# Patient Record
Sex: MALE | Race: WHITE | NOT HISPANIC OR LATINO | URBAN - METROPOLITAN AREA
[De-identification: names, ages, dates, MRNs, and addresses within clinical notes are randomized per-mention and may not be internally consistent; named-entity substitution may affect disease eponyms.]

---

## 2017-06-12 PROBLEM — Z00.00 ENCOUNTER FOR PREVENTIVE HEALTH EXAMINATION: Status: ACTIVE | Noted: 2017-06-12

## 2017-06-14 RX ORDER — SODIUM CHLORIDE 9 MG/ML
1000 INJECTION, SOLUTION INTRAVENOUS
Qty: 0 | Refills: 0 | Status: DISCONTINUED | OUTPATIENT
Start: 2017-06-15 | End: 2017-06-30

## 2017-06-14 NOTE — ASU PATIENT PROFILE, ADULT - PSH
Hernia, inguinal  30 years ago  S/P epidural steroid injection  x3  last one 5 years ago  S/P tonsillectomy  as child

## 2017-06-14 NOTE — ASU PATIENT PROFILE, ADULT - PMH
Gastroesophageal reflux disease, esophagitis presence not specified Anxiety    Gastroesophageal reflux disease, esophagitis presence not specified    HTN (hypertension)

## 2017-06-15 ENCOUNTER — OUTPATIENT (OUTPATIENT)
Dept: OUTPATIENT SERVICES | Facility: HOSPITAL | Age: 62
LOS: 1 days | Discharge: HOME | End: 2017-06-15

## 2017-06-15 VITALS
HEART RATE: 61 BPM | TEMPERATURE: 98 F | OXYGEN SATURATION: 100 % | SYSTOLIC BLOOD PRESSURE: 139 MMHG | DIASTOLIC BLOOD PRESSURE: 79 MMHG | RESPIRATION RATE: 16 BRPM

## 2017-06-15 VITALS
RESPIRATION RATE: 16 BRPM | TEMPERATURE: 99 F | DIASTOLIC BLOOD PRESSURE: 95 MMHG | OXYGEN SATURATION: 100 % | SYSTOLIC BLOOD PRESSURE: 152 MMHG | WEIGHT: 164.91 LBS | HEIGHT: 65 IN | HEART RATE: 50 BPM

## 2017-06-15 DIAGNOSIS — Z92.241 PERSONAL HISTORY OF SYSTEMIC STEROID THERAPY: Chronic | ICD-10-CM

## 2017-06-15 DIAGNOSIS — Z90.89 ACQUIRED ABSENCE OF OTHER ORGANS: Chronic | ICD-10-CM

## 2017-06-15 DIAGNOSIS — K40.90 UNILATERAL INGUINAL HERNIA, WITHOUT OBSTRUCTION OR GANGRENE, NOT SPECIFIED AS RECURRENT: Chronic | ICD-10-CM

## 2017-06-15 RX ORDER — PANTOPRAZOLE SODIUM 20 MG/1
1 TABLET, DELAYED RELEASE ORAL
Qty: 0 | Refills: 0 | COMMUNITY

## 2017-06-15 NOTE — ASU DISCHARGE PLAN (ADULT/PEDIATRIC). - MEDICATION SUMMARY - MEDICATIONS TO TAKE
I will START or STAY ON the medications listed below when I get home from the hospital:    gabapentin 300 mg oral capsule  -- 1 cap(s) by mouth 3 times a day  -- Indication: For ATYPICAL FACE PAIN    nortriptyline 25 mg oral capsule  -- 1 tab(s) by mouth once a day  -- Indication: For ATYPICAL FACE PAIN    Lexapro 10 mg oral tablet  -- 1 tab(s) by mouth once a day  -- Indication: For Anxiety    Xanax 0.5 mg oral tablet  -- 1 tab(s) by mouth 3 times a day, As Needed  -- Indication: For Anxiety    atenolol 25 mg oral tablet  -- 1 tab(s) by mouth once a day  -- Indication: For HTN (hypertension)

## 2017-06-21 DIAGNOSIS — K21.9 GASTRO-ESOPHAGEAL REFLUX DISEASE WITHOUT ESOPHAGITIS: ICD-10-CM

## 2017-06-21 DIAGNOSIS — G50.1 ATYPICAL FACIAL PAIN: ICD-10-CM

## 2017-06-21 DIAGNOSIS — F41.9 ANXIETY DISORDER, UNSPECIFIED: ICD-10-CM

## 2017-06-21 DIAGNOSIS — I10 ESSENTIAL (PRIMARY) HYPERTENSION: ICD-10-CM

## 2017-06-30 PROBLEM — K21.9 GASTRO-ESOPHAGEAL REFLUX DISEASE WITHOUT ESOPHAGITIS: Chronic | Status: ACTIVE | Noted: 2017-06-14

## 2017-07-13 ENCOUNTER — OUTPATIENT (OUTPATIENT)
Dept: OUTPATIENT SERVICES | Facility: HOSPITAL | Age: 62
LOS: 1 days | Discharge: HOME | End: 2017-07-13

## 2017-07-13 VITALS
RESPIRATION RATE: 16 BRPM | TEMPERATURE: 98 F | WEIGHT: 169.98 LBS | SYSTOLIC BLOOD PRESSURE: 138 MMHG | HEIGHT: 65 IN | OXYGEN SATURATION: 99 % | DIASTOLIC BLOOD PRESSURE: 89 MMHG | HEART RATE: 58 BPM

## 2017-07-13 VITALS
RESPIRATION RATE: 16 BRPM | TEMPERATURE: 98 F | DIASTOLIC BLOOD PRESSURE: 76 MMHG | HEART RATE: 60 BPM | OXYGEN SATURATION: 98 % | SYSTOLIC BLOOD PRESSURE: 146 MMHG

## 2017-07-13 DIAGNOSIS — K40.90 UNILATERAL INGUINAL HERNIA, WITHOUT OBSTRUCTION OR GANGRENE, NOT SPECIFIED AS RECURRENT: Chronic | ICD-10-CM

## 2017-07-13 DIAGNOSIS — Z92.241 PERSONAL HISTORY OF SYSTEMIC STEROID THERAPY: Chronic | ICD-10-CM

## 2017-07-13 DIAGNOSIS — Z90.89 ACQUIRED ABSENCE OF OTHER ORGANS: Chronic | ICD-10-CM

## 2017-07-13 NOTE — ASU PATIENT PROFILE, ADULT - PMH
Anxiety    Gastroesophageal reflux disease, esophagitis presence not specified    HTN (hypertension)

## 2017-07-13 NOTE — ASU DISCHARGE PLAN (ADULT/PEDIATRIC). - MEDICATION SUMMARY - MEDICATIONS TO TAKE
I will START or STAY ON the medications listed below when I get home from the hospital:    gabapentin 300 mg oral capsule  -- 1 cap(s) by mouth 3 times a day  -- Indication: For ATYPICAL FACE PAIN    nortriptyline 25 mg oral capsule  -- 1 tab(s) by mouth once a day  -- Indication: For ATYPICAL FACE PAIN    Lexapro 10 mg oral tablet  -- 1 tab(s) by mouth once a day  -- Indication: For per pmd    Xanax 0.5 mg oral tablet  -- 1 tab(s) by mouth 3 times a day, As Needed  -- Indication: For per pmd    atenolol 25 mg oral tablet  -- 1 tab(s) by mouth once a day  -- Indication: For per pmd

## 2017-07-19 DIAGNOSIS — G50.1 ATYPICAL FACIAL PAIN: ICD-10-CM

## 2017-07-19 DIAGNOSIS — I10 ESSENTIAL (PRIMARY) HYPERTENSION: ICD-10-CM

## 2017-07-19 DIAGNOSIS — F32.9 MAJOR DEPRESSIVE DISORDER, SINGLE EPISODE, UNSPECIFIED: ICD-10-CM

## 2017-07-19 DIAGNOSIS — K21.9 GASTRO-ESOPHAGEAL REFLUX DISEASE WITHOUT ESOPHAGITIS: ICD-10-CM

## 2017-07-19 DIAGNOSIS — Z87.891 PERSONAL HISTORY OF NICOTINE DEPENDENCE: ICD-10-CM

## 2017-07-19 DIAGNOSIS — F41.9 ANXIETY DISORDER, UNSPECIFIED: ICD-10-CM

## 2017-08-17 ENCOUNTER — OUTPATIENT (OUTPATIENT)
Dept: OUTPATIENT SERVICES | Facility: HOSPITAL | Age: 62
LOS: 1 days | Discharge: HOME | End: 2017-08-17
Payer: MEDICARE

## 2017-08-17 VITALS
OXYGEN SATURATION: 98 % | TEMPERATURE: 98 F | RESPIRATION RATE: 16 BRPM | WEIGHT: 175.49 LBS | HEART RATE: 61 BPM | HEIGHT: 66 IN | SYSTOLIC BLOOD PRESSURE: 144 MMHG | DIASTOLIC BLOOD PRESSURE: 87 MMHG

## 2017-08-17 VITALS
OXYGEN SATURATION: 99 % | DIASTOLIC BLOOD PRESSURE: 80 MMHG | TEMPERATURE: 98 F | SYSTOLIC BLOOD PRESSURE: 137 MMHG | HEART RATE: 66 BPM | RESPIRATION RATE: 16 BRPM

## 2017-08-17 DIAGNOSIS — K40.90 UNILATERAL INGUINAL HERNIA, WITHOUT OBSTRUCTION OR GANGRENE, NOT SPECIFIED AS RECURRENT: Chronic | ICD-10-CM

## 2017-08-17 DIAGNOSIS — Z90.89 ACQUIRED ABSENCE OF OTHER ORGANS: Chronic | ICD-10-CM

## 2017-08-17 DIAGNOSIS — Z92.241 PERSONAL HISTORY OF SYSTEMIC STEROID THERAPY: Chronic | ICD-10-CM

## 2017-08-17 PROCEDURE — 93010 ELECTROCARDIOGRAM REPORT: CPT

## 2017-08-17 NOTE — ASU DISCHARGE PLAN (ADULT/PEDIATRIC). - MEDICATION SUMMARY - MEDICATIONS TO TAKE
I will START or STAY ON the medications listed below when I get home from the hospital:    gabapentin 300 mg oral capsule  -- 1 cap(s) by mouth 3 times a day  -- Indication: For ATYPICAL FACIAL PAIN    nortriptyline 25 mg oral capsule  -- 1 tab(s) by mouth once a day  -- Indication: For ATYPICAL FACIAL PAIN    Lexapro 10 mg oral tablet  -- 1 tab(s) by mouth once a day  -- Indication: For per pmd    Xanax 0.5 mg oral tablet  -- 1 tab(s) by mouth 3 times a day, As Needed  -- Indication: For per pmd    atenolol 25 mg oral tablet  -- 1 tab(s) by mouth once a day  -- Indication: For per pmd

## 2017-08-24 DIAGNOSIS — F41.9 ANXIETY DISORDER, UNSPECIFIED: ICD-10-CM

## 2017-08-24 DIAGNOSIS — Z87.891 PERSONAL HISTORY OF NICOTINE DEPENDENCE: ICD-10-CM

## 2017-08-24 DIAGNOSIS — G50.1 ATYPICAL FACIAL PAIN: ICD-10-CM

## 2017-08-24 DIAGNOSIS — K21.9 GASTRO-ESOPHAGEAL REFLUX DISEASE WITHOUT ESOPHAGITIS: ICD-10-CM

## 2017-08-24 DIAGNOSIS — I10 ESSENTIAL (PRIMARY) HYPERTENSION: ICD-10-CM

## 2017-09-27 RX ORDER — FAMOTIDINE 10 MG/ML
20 INJECTION INTRAVENOUS ONCE
Qty: 0 | Refills: 0 | Status: COMPLETED | OUTPATIENT
Start: 2017-09-28 | End: 2017-09-28

## 2017-09-27 RX ORDER — SODIUM CHLORIDE 9 MG/ML
3 INJECTION INTRAMUSCULAR; INTRAVENOUS; SUBCUTANEOUS EVERY 8 HOURS
Qty: 0 | Refills: 0 | Status: DISCONTINUED | OUTPATIENT
Start: 2017-09-28 | End: 2017-10-13

## 2017-09-28 ENCOUNTER — OUTPATIENT (OUTPATIENT)
Dept: OUTPATIENT SERVICES | Facility: HOSPITAL | Age: 62
LOS: 1 days | Discharge: HOME | End: 2017-09-28

## 2017-09-28 VITALS
SYSTOLIC BLOOD PRESSURE: 140 MMHG | OXYGEN SATURATION: 100 % | RESPIRATION RATE: 16 BRPM | DIASTOLIC BLOOD PRESSURE: 75 MMHG | TEMPERATURE: 98 F | HEART RATE: 60 BPM

## 2017-09-28 VITALS
SYSTOLIC BLOOD PRESSURE: 140 MMHG | WEIGHT: 170.42 LBS | RESPIRATION RATE: 17 BRPM | HEIGHT: 66 IN | HEART RATE: 60 BPM | OXYGEN SATURATION: 100 % | DIASTOLIC BLOOD PRESSURE: 84 MMHG | TEMPERATURE: 98 F

## 2017-09-28 DIAGNOSIS — K40.90 UNILATERAL INGUINAL HERNIA, WITHOUT OBSTRUCTION OR GANGRENE, NOT SPECIFIED AS RECURRENT: Chronic | ICD-10-CM

## 2017-09-28 DIAGNOSIS — Z92.241 PERSONAL HISTORY OF SYSTEMIC STEROID THERAPY: Chronic | ICD-10-CM

## 2017-09-28 DIAGNOSIS — Z90.89 ACQUIRED ABSENCE OF OTHER ORGANS: Chronic | ICD-10-CM

## 2017-09-28 LAB
ANION GAP SERPL CALC-SCNC: 3 MMOL/L — LOW (ref 5–17)
BUN SERPL-MCNC: 13 MG/DL — SIGNIFICANT CHANGE UP (ref 7–23)
CALCIUM SERPL-MCNC: 9 MG/DL — SIGNIFICANT CHANGE UP (ref 8.5–10.1)
CHLORIDE SERPL-SCNC: 104 MMOL/L — SIGNIFICANT CHANGE UP (ref 96–108)
CO2 SERPL-SCNC: 30 MMOL/L — SIGNIFICANT CHANGE UP (ref 22–31)
CREAT SERPL-MCNC: 0.88 MG/DL — SIGNIFICANT CHANGE UP (ref 0.5–1.3)
GLUCOSE SERPL-MCNC: 93 MG/DL — SIGNIFICANT CHANGE UP (ref 70–99)
HCT VFR BLD CALC: 42.8 % — SIGNIFICANT CHANGE UP (ref 39–50)
HGB BLD-MCNC: 14.3 G/DL — SIGNIFICANT CHANGE UP (ref 13–17)
MCHC RBC-ENTMCNC: 30.4 PG — SIGNIFICANT CHANGE UP (ref 27–34)
MCHC RBC-ENTMCNC: 33.4 GM/DL — SIGNIFICANT CHANGE UP (ref 32–36)
MCV RBC AUTO: 91.1 FL — SIGNIFICANT CHANGE UP (ref 80–100)
PLATELET # BLD AUTO: 252 K/UL — SIGNIFICANT CHANGE UP (ref 150–400)
POTASSIUM SERPL-MCNC: 4.1 MMOL/L — SIGNIFICANT CHANGE UP (ref 3.5–5.3)
POTASSIUM SERPL-SCNC: 4.1 MMOL/L — SIGNIFICANT CHANGE UP (ref 3.5–5.3)
RBC # BLD: 4.7 M/UL — SIGNIFICANT CHANGE UP (ref 4.2–5.8)
RBC # FLD: 12.4 % — SIGNIFICANT CHANGE UP (ref 10.3–14.5)
SODIUM SERPL-SCNC: 137 MMOL/L — SIGNIFICANT CHANGE UP (ref 135–145)
WBC # BLD: 6.3 K/UL — SIGNIFICANT CHANGE UP (ref 3.8–10.5)
WBC # FLD AUTO: 6.3 K/UL — SIGNIFICANT CHANGE UP (ref 3.8–10.5)

## 2017-09-28 RX ORDER — NORTRIPTYLINE HYDROCHLORIDE 10 MG/1
1 CAPSULE ORAL
Qty: 0 | Refills: 0 | COMMUNITY

## 2017-09-28 RX ORDER — CELECOXIB 200 MG/1
200 CAPSULE ORAL ONCE
Qty: 0 | Refills: 0 | Status: COMPLETED | OUTPATIENT
Start: 2017-09-28 | End: 2017-09-28

## 2017-09-28 RX ORDER — ATENOLOL 25 MG/1
1 TABLET ORAL
Qty: 0 | Refills: 0 | COMMUNITY

## 2017-09-28 RX ORDER — OXYCODONE HYDROCHLORIDE 5 MG/1
10 TABLET ORAL ONCE
Qty: 0 | Refills: 0 | Status: COMPLETED | OUTPATIENT
Start: 2017-09-28 | End: 2017-09-28

## 2017-09-28 RX ORDER — GABAPENTIN 400 MG/1
1 CAPSULE ORAL
Qty: 0 | Refills: 0 | COMMUNITY

## 2017-09-28 RX ORDER — ALPRAZOLAM 0.25 MG
1 TABLET ORAL
Qty: 0 | Refills: 0 | COMMUNITY

## 2017-09-28 RX ORDER — ESCITALOPRAM OXALATE 10 MG/1
1 TABLET, FILM COATED ORAL
Qty: 0 | Refills: 0 | COMMUNITY

## 2017-09-28 RX ADMIN — FAMOTIDINE 20 MILLIGRAM(S): 10 INJECTION INTRAVENOUS at 11:59

## 2017-09-28 RX ADMIN — CELECOXIB 200 MILLIGRAM(S): 200 CAPSULE ORAL at 11:59

## 2017-09-28 NOTE — BRIEF OPERATIVE NOTE - PROCEDURE
<<-----Click on this checkbox to enter Procedure Left stellate ganglion block  09/28/2017  flouroscopic guided  Active  [id number]

## 2017-09-28 NOTE — ASU DISCHARGE PLAN (ADULT/PEDIATRIC). - MEDICATION SUMMARY - MEDICATIONS TO TAKE
I will START or STAY ON the medications listed below when I get home from the hospital:    gabapentin 300 mg oral capsule  -- 1 cap(s) by mouth 3 times a day  -- Indication: For atypical facial pain    nortriptyline 25 mg oral capsule  -- 1 tab(s) by mouth once a day  -- Indication: For atypical facial pain    Lexapro 10 mg oral tablet  -- 1 tab(s) by mouth once a day  -- Indication: For per pmd    Xanax 0.5 mg oral tablet  -- 1 tab(s) by mouth 3 times a day, As Needed  -- Indication: For per pme    atenolol 25 mg oral tablet  -- 1 tab(s) by mouth once a day  -- Indication: For per pmd

## 2017-10-04 DIAGNOSIS — G50.1 ATYPICAL FACIAL PAIN: ICD-10-CM

## 2017-10-04 DIAGNOSIS — K21.9 GASTRO-ESOPHAGEAL REFLUX DISEASE WITHOUT ESOPHAGITIS: ICD-10-CM

## 2017-10-04 DIAGNOSIS — F41.9 ANXIETY DISORDER, UNSPECIFIED: ICD-10-CM

## 2017-10-04 DIAGNOSIS — I10 ESSENTIAL (PRIMARY) HYPERTENSION: ICD-10-CM

## 2018-03-04 NOTE — BRIEF OPERATIVE NOTE - ASSISTANT(S)
none

## 2019-08-14 NOTE — ASU PATIENT PROFILE, ADULT - HARM RISK FACTORS
no
20 y/o otherwise health male ? atraumatic headache with nausea and vomiting. Will obtain labs including coags, head CT, give Reglan, and reassess.

## 2020-09-18 NOTE — BRIEF OPERATIVE NOTE - SPECIMENS
"S:  Lenora returns 2 weeks after laparoscopic appendectomy  by Dr. Martinez for acute appendicitis. Doing well post operatively, tolerating a general diet, having regular bowel movements, passing flatus, no diarrhea, doesn't take narcotics. Specifically denies fevers, chills, nausea, vomiting, shortness of breath.     O:  BP 94/60   Pulse 90   Temp 97.6  F (36.4  C)   Ht 1.575 m (5' 2\")   Wt 67.1 kg (148 lb)   SpO2 96%   BMI 27.07 kg/m    Gen: Alert and orientedx4, no apparent distress, ambulating without difficulty  Abd: Soft, ND/NT no rebound, no guarding  Wound: Clean, dry, intact, no erythema, necrosis or drainage worrisome for infection.     A/P  #1 S/P laparoscopic appendectomy     She looks wonderful. She has made a satisfactory recovery from surgery. I do not need to see her again unless she has any issues in regard to this surgery. I've provided office phone numbers and told her not to hesitate to call with any questions, comments or concerns. I'd like a call if she has any fevers over 101.3, nausea/vomiting, pain out of control.  She  been instructed to refrain from lifting greater than 10 lbs (or a gallon of milk) for another 2 weeks and to avoid strenuous activity as well.    TRAVIS Muhammad CNS CNS  Surgery & Wound Care  [place]  " none

## 2020-11-25 PROBLEM — F41.9 ANXIETY DISORDER, UNSPECIFIED: Chronic | Status: ACTIVE | Noted: 2017-06-15

## 2020-11-25 PROBLEM — I10 ESSENTIAL (PRIMARY) HYPERTENSION: Chronic | Status: ACTIVE | Noted: 2017-06-15

## 2020-11-30 ENCOUNTER — APPOINTMENT (OUTPATIENT)
Dept: SPINE | Facility: CLINIC | Age: 65
End: 2020-11-30
Payer: MEDICARE

## 2020-11-30 VITALS
OXYGEN SATURATION: 97 % | HEART RATE: 75 BPM | DIASTOLIC BLOOD PRESSURE: 91 MMHG | TEMPERATURE: 98.3 F | WEIGHT: 160 LBS | HEIGHT: 66 IN | BODY MASS INDEX: 25.71 KG/M2 | SYSTOLIC BLOOD PRESSURE: 149 MMHG

## 2020-11-30 PROCEDURE — 99203 OFFICE O/P NEW LOW 30 MIN: CPT

## 2020-11-30 RX ORDER — ESCITALOPRAM OXALATE 5 MG/1
TABLET, FILM COATED ORAL
Refills: 0 | Status: ACTIVE | COMMUNITY

## 2020-11-30 NOTE — ASSESSMENT
[FreeTextEntry1] : Right sided  lumbar radiculopathy superimposed on peripheral neuropathy and an MRI of the lower spine will  be ordered to evaluate his symptoms.  Once the images are complete, he will return for image review.  \par \par \par  \par \par CC:\par Red Sheehan MD\par [facility] \[place] [address]

## 2020-11-30 NOTE — REVIEW OF SYSTEMS
[Leg Weakness] : leg weakness [Numbness] : numbness [Tingling] : tingling [Difficulty Walking] : difficulty walking [Negative] : Heme/Lymph [de-identified] : back and right leg pain

## 2020-11-30 NOTE — HISTORY OF PRESENT ILLNESS
[> 3 months] : more  than 3 months [FreeTextEntry1] : Lumbar Radiculopathy [de-identified] : 65-year-old gentleman ith a long history of chronic pain in multiple areas who has been diagnosed in the past by EMG testing of small fiber peripheral neuropathy.  [place] onset of walking difficulty.  He went to the ED at [facility] and he was treated with Morphine and imaging and was sent home. An abdominal CT was done to rule out renal stone. It did not show a thing that would course patient's right-sided leg and groin pain. He reports back and right lower leg pain.  The pain is sharp and shooting rated at a 10/10.  .  He has tightness of his thigh on the right side .  The pain is described as electric shocks, into the leg .  Occasional pain in the left leg pain.  A CT scan  was performed in the ED .  No urine or stool incontinence.  EMG studies were complete in the past in 2019  . [Pain] : pain [___ wks] : [unfilled] week(s) ago [10] : currently ~his/her~ pain is 10 out of 10 [Standing] : worsened by standing [Walking] : worsened by walking [Recumbency] : relieved by recumbency [Rest] : relieved by rest

## 2020-11-30 NOTE — HISTORY OF PRESENT ILLNESS
[> 3 months] : more  than 3 months [FreeTextEntry1] : Lumbar Radiculopathy [de-identified] : 65-year-old gentleman ith a long history of chronic pain in multiple areas who has been diagnosed in the past by EMG testing of small fiber peripheral neuropathy.  [place] experienced an acute onset of walking difficulty.  He went to the ED at [facility] and he was treated with Morphine and imaging and was sent home. An abdominal CT was done to rule out renal stone. It did not show a thing that would course patient's right-sided leg and groin pain. He reports back and right lower leg pain.  The pain is sharp and shooting rated at a 10/10.  .  He has tightness of his thigh on the right side .  The pain is described as electric shocks, into the leg .  Occasional pain in the left leg pain.  A CT scan  was performed in the ED .  No urine or stool incontinence.  EMG studies were complete in the past in 2019  . [Pain] : pain [___ wks] : [unfilled] week(s) ago [10] : currently ~his/her~ pain is 10 out of 10 [Standing] : worsened by standing [Walking] : worsened by walking [Recumbency] : relieved by recumbency [Rest] : relieved by rest

## 2020-11-30 NOTE — PHYSICAL EXAM
[Person] : oriented to person [Place] : oriented to place [Time] : oriented to time [Short Term Intact] : short term memory intact [Remote Intact] : remote memory intact [Span Intact] : the attention span was normal [Concentration Intact] : normal concentrating ability [Fluency] : fluency intact [Comprehension] : comprehension intact [Current Events] : adequate knowledge of current events [Past History] : adequate knowledge of personal past history [Vocabulary] : adequate range of vocabulary [Cranial Nerves Optic (II)] : visual acuity intact bilaterally,  pupils equal round and reactive to light [Cranial Nerves Oculomotor (III)] : extraocular motion intact [Cranial Nerves Trigeminal (V)] : facial sensation intact symmetrically [Cranial Nerves Facial (VII)] : face symmetrical [Cranial Nerves Vestibulocochlear (VIII)] : hearing was intact bilaterally [Cranial Nerves Glossopharyngeal (IX)] : tongue and palate midline [Cranial Nerves Accessory (XI - Cranial And Spinal)] : head turning and shoulder shrug symmetric [Cranial Nerves Hypoglossal (XII)] : there was no tongue deviation with protrusion [Motor Tone] : muscle tone was normal in all four extremities [Motor Strength] : muscle strength was normal in all four extremities [No Muscle Atrophy] : normal bulk in all four extremities [Sensation Tactile Decrease] : light touch was intact [Abnormal Walk] : normal gait [Balance] : balance was intact [Past-pointing] : there was no past-pointing [Tremor] : no tremor present [1+] : Ankle jerk left 1+ [Plantar Reflex Right Only] : normal on the right [Plantar Reflex Left Only] : normal on the left [___] : absent on the right [___] : absent on the left [Persaud] : Persaud's sign was not demonstrated [L'Hermitte's] : neck flexion did not produce tingling down the spine/arms [Spurling's - Opposite Side] : Negative Spurling's on opposite side [Spurling's Same Side] : Negative Spurling's on same side [No Tenderness to Palpation] : no spine tenderness on palpation [Straight-Leg Raise Test - Left] : straight leg raise of the left leg was negative [Straight-Leg Raise Test - Right] : straight leg raise  of the right leg was negative [Able to toe walk] : the patient was able to toe walk [Able to heel walk] : the patient was able to heel walk

## 2020-11-30 NOTE — ASSESSMENT
[FreeTextEntry1] : Right sided  lumbar radiculopathy superimposed on peripheral neuropathy and an MRI of the lower spine will  be ordered to evaluate his symptoms.  Once the images are complete, he will return for image review.  \par \par \par  \par \par CC:\par Red Sheehan MD\par [facility] \[place] [address]

## 2020-11-30 NOTE — CONSULT LETTER
[Dear  ___] : Dear  [unfilled], [Consult Letter:] : I had the pleasure of evaluating your patient, [unfilled]. [Please see my note below.] : Please see my note below. [Consult Closing:] : Thank you very much for allowing me to participate in the care of this patient.  If you have any questions, please do not hesitate to contact me. [Sincerely,] : Sincerely, [FreeTextEntry3] : Fortino Stock MD\par Chief of Neurosurgery [facility]\par [facility]\par

## 2020-11-30 NOTE — REVIEW OF SYSTEMS
[Leg Weakness] : leg weakness [Numbness] : numbness [Tingling] : tingling [Difficulty Walking] : difficulty walking [Negative] : Heme/Lymph [de-identified] : back and right leg pain

## 2020-11-30 NOTE — CONSULT LETTER
[Dear  ___] : Dear  [unfilled], [Consult Letter:] : I had the pleasure of evaluating your patient, [unfilled]. [Please see my note below.] : Please see my note below. [Consult Closing:] : Thank you very much for allowing me to participate in the care of this patient.  If you have any questions, please do not hesitate to contact me. [Sincerely,] : Sincerely, [FreeTextEntry3] : Fortino Stock MD\par Chief of Neurosurgery [facility]\par [facility]\par

## 2020-11-30 NOTE — REASON FOR VISIT
[New Patient Visit] : a new patient visit [Other: _____] : [unfilled] [FreeTextEntry1] : Lumbar radiculopathy

## 2020-12-07 ENCOUNTER — APPOINTMENT (OUTPATIENT)
Dept: SPINE | Facility: CLINIC | Age: 65
End: 2020-12-07
Payer: MEDICARE

## 2020-12-07 VITALS
TEMPERATURE: 98 F | WEIGHT: 160 LBS | RESPIRATION RATE: 18 BRPM | HEART RATE: 80 BPM | DIASTOLIC BLOOD PRESSURE: 90 MMHG | OXYGEN SATURATION: 96 % | HEIGHT: 66 IN | SYSTOLIC BLOOD PRESSURE: 155 MMHG | BODY MASS INDEX: 25.71 KG/M2

## 2020-12-07 DIAGNOSIS — F17.200 NICOTINE DEPENDENCE, UNSPECIFIED, UNCOMPLICATED: ICD-10-CM

## 2020-12-07 DIAGNOSIS — M54.16 RADICULOPATHY, LUMBAR REGION: ICD-10-CM

## 2020-12-07 PROCEDURE — 99212 OFFICE O/P EST SF 10 MIN: CPT

## 2020-12-07 NOTE — PHYSICAL EXAM
[Motor Strength] : muscle strength was normal in all four extremities [FreeTextEntry8] : using a single crutch

## 2020-12-07 NOTE — ASSESSMENT
[FreeTextEntry1] : 65 year old male with lower back and right sided radicular pain.   A far lateral disc herniation is noted.  He will undergo PT and see a pain specialist - Dr Hutchinson.   In one month he  will return for follow up.

## 2020-12-07 NOTE — REASON FOR VISIT
[Follow-Up: _____] : a [unfilled] follow-up visit [Spouse] : spouse [FreeTextEntry1] : 65 year old male with lower back and leg pain, more on the right side.  He has numbness of his right leg.   An updated MRI was ordered and shows a far lateral disc herniation on the right at L2-3.  There are other areas of noncompressive degenerative changes. He has not had any physical therapy recently and has not had any pain management with epidural steroid injections. the strength and sensation are normal. We'll try a course of physical therapy and pain management prior to considering surgery.

## 2020-12-07 NOTE — REVIEW OF SYSTEMS
[Numbness] : numbness [Tingling] : tingling [Difficulty Walking] : difficulty walking [Negative] : Heme/Lymph [de-identified] : lower back pain and right leg pain

## 2021-01-07 ENCOUNTER — APPOINTMENT (OUTPATIENT)
Dept: SPINE | Facility: CLINIC | Age: 66
End: 2021-01-07

## 2021-07-06 ENCOUNTER — APPOINTMENT (OUTPATIENT)
Dept: OTOLARYNGOLOGY | Facility: CLINIC | Age: 66
End: 2021-07-06
Payer: MEDICARE

## 2021-07-06 VITALS
DIASTOLIC BLOOD PRESSURE: 95 MMHG | HEIGHT: 66 IN | SYSTOLIC BLOOD PRESSURE: 191 MMHG | HEART RATE: 60 BPM | WEIGHT: 165 LBS | BODY MASS INDEX: 26.52 KG/M2

## 2021-07-06 PROCEDURE — 31575 DIAGNOSTIC LARYNGOSCOPY: CPT

## 2021-07-06 PROCEDURE — 99204 OFFICE O/P NEW MOD 45 MIN: CPT | Mod: 25

## 2021-07-06 RX ORDER — ESCITALOPRAM OXALATE 20 MG/1
20 TABLET ORAL
Qty: 90 | Refills: 0 | Status: COMPLETED | COMMUNITY
Start: 2020-10-27

## 2021-07-06 RX ORDER — AMLODIPINE BESYLATE 5 MG/1
TABLET ORAL
Refills: 0 | Status: ACTIVE | COMMUNITY

## 2021-07-06 RX ORDER — PANTOPRAZOLE 40 MG/1
40 TABLET, DELAYED RELEASE ORAL
Qty: 14 | Refills: 0 | Status: ACTIVE | COMMUNITY
Start: 2021-06-02

## 2021-07-06 RX ORDER — MONTELUKAST 10 MG/1
10 TABLET, FILM COATED ORAL
Qty: 30 | Refills: 0 | Status: COMPLETED | COMMUNITY
Start: 2021-02-15

## 2021-07-06 NOTE — REASON FOR VISIT
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

## 2021-07-06 NOTE — HISTORY OF PRESENT ILLNESS
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  States having radiating burning pain from left eye, nose, throat and uvula down to [place].  This has been the case for 16 years.  Patient states dysphagia with solids and liquids, constant need to swallow, slurs words and voice changes, feels like he is drooling and is constantly needing to clean his mouth. Denies odynophagia, dyspnea, throat infections.  His weight is stable.  States has daily left ear tinnitus, and states feels his balance is constantly off and getting worse.  Patient denies otalgia, otorrhea, ear infections, hearing loss, tinnitus, dizziness, vertigo, headaches related to hearing.  has a history of sinusitis issues from childhood, but states no issues since then.  States nasal congestion with season changes.   was exposed to a large amount of caustic soda in the past due to working as a .  States taking pantoprazole as needed.  He has 2-4 beers daily.  He has smoking marijuana 2-3 times daily.  He smoked cigarettes for about 5 years.

## 2021-10-05 ENCOUNTER — APPOINTMENT (OUTPATIENT)
Dept: OTOLARYNGOLOGY | Facility: CLINIC | Age: 66
End: 2021-10-05

## 2022-01-13 ENCOUNTER — APPOINTMENT (OUTPATIENT)
Dept: RHEUMATOLOGY | Facility: CLINIC | Age: 67
End: 2022-01-13

## 2022-02-10 ENCOUNTER — NON-APPOINTMENT (OUTPATIENT)
Age: 67
End: 2022-02-10

## 2022-02-10 ENCOUNTER — APPOINTMENT (OUTPATIENT)
Dept: GASTROENTEROLOGY | Facility: CLINIC | Age: 67
End: 2022-02-10
Payer: MEDICARE

## 2022-02-10 VITALS
SYSTOLIC BLOOD PRESSURE: 140 MMHG | OXYGEN SATURATION: 95 % | BODY MASS INDEX: 25.07 KG/M2 | HEART RATE: 67 BPM | TEMPERATURE: 97.7 F | DIASTOLIC BLOOD PRESSURE: 90 MMHG | WEIGHT: 156 LBS | HEIGHT: 66 IN

## 2022-02-10 DIAGNOSIS — K21.9 GASTRO-ESOPHAGEAL REFLUX DISEASE W/OUT ESOPHAGITIS: ICD-10-CM

## 2022-02-10 PROCEDURE — 99205 OFFICE O/P NEW HI 60 MIN: CPT

## 2022-02-10 RX ORDER — NORTRIPTYLINE HYDROCHLORIDE 25 MG/1
25 CAPSULE ORAL
Qty: 90 | Refills: 0 | Status: DISCONTINUED | COMMUNITY
Start: 2021-04-26 | End: 2022-02-10

## 2022-02-14 ENCOUNTER — NON-APPOINTMENT (OUTPATIENT)
Age: 67
End: 2022-02-14

## 2022-02-25 ENCOUNTER — OUTPATIENT (OUTPATIENT)
Dept: OUTPATIENT SERVICES | Facility: HOSPITAL | Age: 67
LOS: 1 days | Discharge: HOME | End: 2022-02-25
Payer: MEDICARE

## 2022-02-25 ENCOUNTER — APPOINTMENT (OUTPATIENT)
Dept: GASTROENTEROLOGY | Facility: HOSPITAL | Age: 67
End: 2022-02-25
Payer: MEDICARE

## 2022-02-25 VITALS
HEIGHT: 65 IN | WEIGHT: 154.98 LBS | HEART RATE: 59 BPM | DIASTOLIC BLOOD PRESSURE: 81 MMHG | RESPIRATION RATE: 15 BRPM | OXYGEN SATURATION: 100 % | SYSTOLIC BLOOD PRESSURE: 151 MMHG | TEMPERATURE: 98 F

## 2022-02-25 DIAGNOSIS — Z90.89 ACQUIRED ABSENCE OF OTHER ORGANS: Chronic | ICD-10-CM

## 2022-02-25 DIAGNOSIS — K40.90 UNILATERAL INGUINAL HERNIA, WITHOUT OBSTRUCTION OR GANGRENE, NOT SPECIFIED AS RECURRENT: Chronic | ICD-10-CM

## 2022-02-25 DIAGNOSIS — Z92.241 PERSONAL HISTORY OF SYSTEMIC STEROID THERAPY: Chronic | ICD-10-CM

## 2022-02-25 DIAGNOSIS — K21.9 GASTRO-ESOPHAGEAL REFLUX DISEASE WITHOUT ESOPHAGITIS: ICD-10-CM

## 2022-02-25 PROCEDURE — 91010 ESOPHAGUS MOTILITY STUDY: CPT | Mod: 26

## 2022-02-25 PROCEDURE — 91037 ESOPH IMPED FUNCTION TEST: CPT | Mod: 26

## 2022-02-25 DEVICE — CATH VERSAFLEX Z PH: Type: IMPLANTABLE DEVICE | Status: FUNCTIONAL

## 2022-02-25 NOTE — ASU PATIENT PROFILE, ADULT - NSICDXPASTMEDICALHX_GEN_ALL_CORE_FT
PAST MEDICAL HISTORY:  Anxiety     Gastroesophageal reflux disease, esophagitis presence not specified     HTN (hypertension)

## 2022-02-25 NOTE — ASU PATIENT PROFILE, ADULT - ALCOHOL USE HISTORY SINGLE SELECT
Patient Education   Patient Education     Urinary Tract Infections in Women    Urinary tract infections (UTIs) are most often caused by bacteria. These bacteria enter the urinary tract. The bacteria may come from outside the body. Or they may travel from the skin outside the rectum or vagina into the urethra. Female anatomy makes it easy for bacteria from the bowel to enter a woman’s urinary tract, which is the most common source of UTI. This means women develop UTIs more often than men. Pain in or around the urinary tract is a common UTI symptom. But the only way to know for sure if you have a UTI for the healthcare provider to test your urine. The two tests that may be done are the urinalysis and urine culture.  Types of UTIs  · Cystitis. A bladder infection (cystitis) is the most common UTI in women. You may have urgent or frequent urination. You may also have pain, burning when you urinate, and bloody urine.  · Urethritis. This is an inflamed urethra, which is the tube that carries urine from the bladder to outside the body. You may have lower stomach or back pain. You may also have urgent or frequent urination.  · Pyelonephritis. This is a kidney infection. If not treated, it can be serious and damage your kidneys. In severe cases, you may need to stay in the hospital. You may have a fever and lower back pain.  Medicines to treat a UTI  Most UTIs are treated with antibiotics. These kill the bacteria. The length of time you need to take them depends on the type of infection. It may be as short as 3 days. If you have repeated UTIs, you may need a low-dose antibiotic for several months. Take antibiotics exactly as directed. Don’t stop taking them until all of the medicine is gone. If you stop taking the antibiotic too soon, the infection may not go away. You may also develop a resistance to the antibiotic. This can make it much harder to treat.  Lifestyle changes to treat and prevent UTIs  The lifestyle changes  below will help get rid of your UTI. They may also help prevent future UTIs.  · Drink plenty of fluids. This includes water, juice, or other caffeine-free drinks. Fluids help flush bacteria out of your body.  · Empty your bladder. Always empty your bladder when you feel the urge to urinate. And always urinate before going to sleep. Urine that stays in your bladder can lead to infection. Try to urinate before and after sex as well.  · Practice good personal hygiene. Wipe yourself from front to back after using the toilet. This helps keep bacteria from getting into the urethra.  · Use condoms during sex. These help prevent UTIs caused by sexually transmitted bacteria. Also don't use spermicides during sex. These can increase the risk for UTIs. Choose other forms of birth control instead. For women who tend to get UTIs after sex, a low-dose of a preventive antibiotic may be used. Be sure to discuss this option with your healthcare provider.  · Follow up with your healthcare provider as directed. He or she may test to make sure the infection has cleared. If needed, more treatment may be started.  Date Last Reviewed: 1/1/2017  © 4556-7778 [employer]. [address] [place] [address]. All rights reserved. This information is not intended as a substitute for professional medical care. Always follow your healthcare professional's instructions.           MEDICATION:  NITROFURANTOIN  Nitrofurantoin (brand: Macrodantin, Macrobid, Furadantin) is an antibiotic used for urinary tract infections.  DIRECTIONS FOR USE:  To prevent upset stomach and improve the absorption of this medicine, take this drug with food or milk.  Take this medicine at regular intervals. Take all of the medicine until it is gone, even if you are feeling better. This will ensure that the infection is fully treated.  Do not take within one hour of magnesium-containing antacids.  WHAT TO WATCH FOR:  POSSIBLE SIDE EFFECTS: Nausea,  vomiting, diarrhea, loss of appetite, abdominal pain (Take the medicine with food). Headache, dizziness, asthma attacks, vaginal yeast infection or thrush (Contact your doctor if any of these symptoms persist or become severe). Dark yellow to brown urine color (This is harmless). Chest pain, shortness of breath, unusual cough, easy bruising/bleeding, numbness or tingling in the arms or leg , severe muscle weakness, yellowing of the eyes/skin (Contact your doctor or return to this facility).  ALLERGIC REACTION: Rash, itching, swelling, trouble breathing or swallowing (Contact your doctor or return to this facility promptly).  MEDICAL CONDITIONS: Before starting this medicine, be sure your doctor knows if you have any of the following conditions:  · Last month of pregnancy, breastfeeding an infant less than 1 month old  · Less than 1 month of age  · Kidney or liver disease, diabetes  · Lung disease  · Certain genetic conditions (glucose-6-phosphate dehydrogenase deficiency  · Vitamin B deficiency  · Certain nerve conditions (peripheral neuropathy, optic neuritis)  DRUG INTERACTION: Before starting this medicine, be sure your doctor knows if you are taking any of the following:  · Probenecid  · Quinolone antibiotics (e.g., ciprofloxacin, norfloxacin)  · Live vaccines  · sulfinpyrazone  [NOTE: This information topic may not include all directions, precautions, medical conditions, drug/food interactions, and warnings for this drug. Check with your doctor, nurse, or pharmacist for any questions that you may have.]  © 4351-9545 [facility], [address] [place] [address], [place] [address]. All rights reserved. This information is not intended as a substitute for professional medical care. Always follow your healthcare professional's instructions.      yes...

## 2022-02-25 NOTE — ASU PATIENT PROFILE, ADULT - FALL HARM RISK - UNIVERSAL INTERVENTIONS
Bed in lowest position, wheels locked, appropriate side rails in place/Call bell, personal items and telephone in reach/Instruct patient to call for assistance before getting out of bed or chair/Non-slip footwear when patient is out of bed/[place] to call system/Physically safe environment - no spills, clutter or unnecessary equipment/Purposeful Proactive Rounding/Room/bathroom lighting operational, light cord in reach

## 2022-02-25 NOTE — ASU PATIENT PROFILE, ADULT - NSICDXPASTSURGICALHX_GEN_ALL_CORE_FT
PAST SURGICAL HISTORY:  Hernia, inguinal 30 years ago    S/P epidural steroid injection x3  last one 5 years ago    S/P tonsillectomy as child

## 2022-02-26 PROCEDURE — 91038 ESOPH IMPED FUNCT TEST > 1HR: CPT | Mod: 26

## 2022-03-03 ENCOUNTER — NON-APPOINTMENT (OUTPATIENT)
Age: 67
End: 2022-03-03

## 2022-03-14 ENCOUNTER — APPOINTMENT (OUTPATIENT)
Dept: GASTROENTEROLOGY | Facility: CLINIC | Age: 67
End: 2022-03-14
Payer: MEDICARE

## 2022-03-14 VITALS
DIASTOLIC BLOOD PRESSURE: 90 MMHG | TEMPERATURE: 98.7 F | OXYGEN SATURATION: 98 % | SYSTOLIC BLOOD PRESSURE: 160 MMHG | HEIGHT: 66 IN | HEART RATE: 67 BPM

## 2022-03-14 PROCEDURE — 99214 OFFICE O/P EST MOD 30 MIN: CPT

## 2022-03-28 ENCOUNTER — APPOINTMENT (OUTPATIENT)
Dept: GASTROENTEROLOGY | Facility: CLINIC | Age: 67
End: 2022-03-28
Payer: SELF-PAY

## 2022-03-28 DIAGNOSIS — K21.9 GASTRO-ESOPHAGEAL REFLUX DISEASE W/OUT ESOPHAGITIS: ICD-10-CM

## 2022-03-28 DIAGNOSIS — K14.6 GLOSSODYNIA: ICD-10-CM

## 2022-03-28 PROCEDURE — 97802 MEDICAL NUTRITION INDIV IN: CPT

## 2022-04-25 ENCOUNTER — APPOINTMENT (OUTPATIENT)
Dept: GASTROENTEROLOGY | Facility: CLINIC | Age: 67
End: 2022-04-25

## 2022-07-26 NOTE — ASU DISCHARGE PLAN (ADULT/PEDIATRIC). - PATIENT/GUARDIAN NAME (SIGNATURE): ____________________________________
Statement Selected

## 2024-05-08 ENCOUNTER — APPOINTMENT (OUTPATIENT)
Dept: OTOLARYNGOLOGY | Facility: CLINIC | Age: 69
End: 2024-05-08

## 2024-05-22 ENCOUNTER — APPOINTMENT (OUTPATIENT)
Dept: OTOLARYNGOLOGY | Facility: CLINIC | Age: 69
End: 2024-05-22
Payer: MEDICARE

## 2024-05-22 ENCOUNTER — APPOINTMENT (OUTPATIENT)
Dept: OTOLARYNGOLOGY | Facility: CLINIC | Age: 69
End: 2024-05-22

## 2024-05-22 PROCEDURE — 92612 ENDOSCOPY SWALLOW (FEES) VID: CPT | Mod: GN

## 2024-05-23 NOTE — ASSESSMENT
[FreeTextEntry1] : REPORT OF EVALUATION OF SWALLOW FUNCTION VIA FLEXIBLE ENDOSCOPIC EXAMINATION OF SWALLOWING (FEES)    Patient Name:  Shiv Andrade   Patient :  1955  Medical Diagnosis: Dysphagia   Treatment Diagnosis: Dysphagia  Referring Physician:  Dr. Sánchez   History:  Information on this patient has been provided by the patient. Shiv Andrade was seen for a Flexible Endoscopic Examination of swallowing to rule out aspiration, assess for diet texture change as appropriate, and/or explore positional strategies and/or compensatory techniques to eliminate aspiration.     Reason For Referral: To determine patient's ability to safely tolerate an oral diet.   The physician ordered this procedure because they want the patient to meet their nutrition/hydration needs by mouth without compromising respiratory status.   Per charting and patient report, the patient's PMHx is significant for neuropathy, GERD, burning mouth syndrome, progressive dysphagia and dysarthria for the last 19 years. The patient notes he recently started IVIG treatment. The patient reports ongoing swallowing difficulty marked by coughing during meals and feelings of pharyngeal stasis. He states "it feels as though I am swallowing rocks." The patient denies ever needing the Heimlich maneuver. He further reports burning sensation and drooling. He reports he currently consumes a regular and thin liquid diet level; however, needs to drink liquids through a straw. He further reports difficulty with talking with progressive symptoms noted. Upon SLP questioning, the patient reports he had a previous swallow test approximately 5-6 years ago in [place]; however results were unremarkable at that time. He denies receiving therapy for speech or swallowing. The patient further denies feelings of shortness of breath during or following meals or any recent pneumonias. He reports a >60 pound weight loss and does not follow with a nutritionist.       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    Cognition/Communication: The patient was alert, oriented x4, and demonstrated ability to follow directives independently. The patient presented with a mild-moderate dysarthria marked by slower rate, reduced vocal volume, and reduced articulatory precision at the sentence and conversational level.   Voice: WFL   Current Nutritional Intake:  Regular and thin liquids via straw per patient report    Current Respiratory Status:  _x_  Normal     ___  Tracheostomy Tube       Consistencies Administered:    Solids:  _x__ Regular   ___Soft & bite sized  __x_Pureed     Liquids:  _x_ Thin   ___ Mildly Thick   __Moderately thick                           __ single cup sips   __x_ by straw    FEES PROCEDURE  The patient was explained the FEES procedure and consent was obtained.  A nasendoscope was passed via the right nare without difficulty and positioned above the supraglottic structures.  The epiglottis, aryepiglottic folds, arytenoids, and true vocal folds were clearly visible with evidence of mild edema at the arytenoids and erythema at the arytenoids, anterior laryngeal surface of the epiglottis and cricopharyngeal segment. The TVFs were intact with bilateral movement during phonation. The patient was provided with regular solids, pureed, and thin liquids via straw for the purposes of today's assessment. The patient's oral phase for pureed, solids, and thin liquids was marked by adequate oral acceptance, slower mastication with solids, and prolonged transfer and AP transit time. Premature spillage was noted with thin liquids. The patient's pharyngeal stage was marked by reduced base of tongue retraction, timely pharyngeal swallow trigger with reduced hyolaryngeal elevation and epiglottic closure. The was mild stasis with pureed and thin liquids and moderate stasis with solids at the vallecula and trace stasis at the pyriform sinuses across all consistencies. Laryngeal penetration to the level of the VFs was visualized after the swallow with solids x1. The patient was verbally cued to cough which assisted in partial clearance from the laryngeal vestibule. The patient presented with immediate reflexive cough response with larger sip of thin liquids x1 suggestive of possible aspiration during the swallow. Compensatory strategy of a chin tuck maneuver with solids and liquids was successful in reducing stasis and maintaining airway protection. The patient also benefited from a liquid wash to assist with pharyngeal clearance while maintaining chin down posture. At this point testing was discontinued and the scope was carefully removed, with patient tolerating the entire procedure without difficulty.     Aspiration - Penetration Scale: 1- pureed; 5-regular solids; 1 & 6 thin liquids; 1- solids and thin liquids with chin tuck maneuver  Aspiration - Penetration Scale (Manohar et al Dysphagia 11:93-98 (1996), Aspiration-Penetration Scale)  1.    Material does not enter the airway  2.    Material enters the airway, remains above the vocal folds, and is ejected from the airway  3.    Material enters the airway, remains above the vocal folds, and is not ejected  4.    Material enters the airway, contacts the vocal folds, and is ejected from the airway  5.    Material enters the airway, contacts the vocal folds, and is not ejected from the airway  6.    Material enters the airway, passes below the vocal folds and is ejected into the larynx or out of the airway  7.    Material enters the airway, passes below the vocal folds, and is not ejected from the trachea despite effort  8.    Material enters the airway, passes below the vocal folds, and no effort is made to eject     EDUCATION: Evaluation results and recommendations were discussed with the patient and patient's spouse.   PROGNOSIS: good for recommended diet level and with therapeutic intervention    IMPRESSIONS:  1. Oropharyngeal dysphagia  2. Moderate Dysarthria      RECOMMENDATIONS:  1. The patient was advised to consume a regular solids (avoid dry/denser solids) and thin liquid diet level WITH USE OF CHIN TUCK MANEUVER FOR ALL SOLIDS AND LIQUIDS.   2. The patient was advised to consume small bites/sips, alternate solids with liquids, maintain chin tuck maneuver with solids and liquids, and to position himself upright (no <90 degrees) during all meals and for 30 minutes after meals.  3. Recommend speech and swallowing therapy, 1x/week for 8-10 weeks, to improve overall speech intelligibility and maximize swallow function.   4.Recommend nutritional consult to ensure adequate daily caloric nutritioanl intake given patient's report of weight loss.  5. Follow up with referring MD, as directed.     THERAPY GOALS:   1)The patient will complete tongue base retraction tasks via swallow strengthening exercises with 80% success with minimal cues.  2) The patient will complete hyolaryngeal elevation tasks with 80% accuracy with minimal cues  3)The patient will implement safe swallowing guidelines of slow pacing, single/small bites/sips, chin tuck maneuver with solids/liquids, and alternate solids with liquids with 100% success at an independent level  4)The patient will perform oral motor exercises with 90% success given min cues to improve ROM, strength, and coordination of oral musculature.  5)The patient will utilize trained dysarthria strategies (I.e. slower rate, over articulation) during structured and non-structured tasks with 90% success given min cues to improve communicative effectiveness.      Should you have any questions or concerns please contact the center at [phone])   Pamela Shafer M.A. CCC-SLP  Speech Language Pathologist  [facility]

## 2024-06-14 ENCOUNTER — APPOINTMENT (OUTPATIENT)
Dept: OTOLARYNGOLOGY | Facility: CLINIC | Age: 69
End: 2024-06-14
Payer: MEDICARE

## 2024-06-14 PROCEDURE — 92526 ORAL FUNCTION THERAPY: CPT | Mod: GN

## 2024-06-20 ENCOUNTER — APPOINTMENT (OUTPATIENT)
Dept: OTOLARYNGOLOGY | Facility: CLINIC | Age: 69
End: 2024-06-20
Payer: MEDICARE

## 2024-06-20 PROCEDURE — 92507 TX SP LANG VOICE COMM INDIV: CPT | Mod: GN

## 2024-06-27 ENCOUNTER — APPOINTMENT (OUTPATIENT)
Dept: NEUROLOGY | Facility: CLINIC | Age: 69
End: 2024-06-27

## 2024-06-27 ENCOUNTER — APPOINTMENT (OUTPATIENT)
Dept: OTOLARYNGOLOGY | Facility: CLINIC | Age: 69
End: 2024-06-27

## 2024-07-11 ENCOUNTER — APPOINTMENT (OUTPATIENT)
Dept: OTOLARYNGOLOGY | Facility: CLINIC | Age: 69
End: 2024-07-11

## 2024-08-29 NOTE — ASU PATIENT PROFILE, ADULT - PATIENT KNOW
Problem: Pain  Goal: Acceptable pain level achieved/maintained at rest using appropriate pain scale for the patient  Outcome: Monitoring/Evaluating progress  Goal: Acceptable pain level achieved/maintained with activity using appropriate pain scale for the patient  Outcome: Monitoring/Evaluating progress  Goal: Acceptable pain level achieved/maintained without oversedation  Outcome: Monitoring/Evaluating progress     Problem: At Risk for Falls  Goal: Patient does not fall  Outcome: Monitoring/Evaluating progress  Goal: Patient takes action to control fall-related risks  Outcome: Monitoring/Evaluating progress     Problem: At Risk for Injury Due to Fall  Goal: Patient does not fall  Outcome: Monitoring/Evaluating progress  Goal: Takes action to control condition specific risks  Outcome: Monitoring/Evaluating progress  Goal: Verbalizes understanding of fall-related injury personal risks  Description: Document education using the patient education activity  Outcome: Monitoring/Evaluating progress      yes

## 2024-09-27 ENCOUNTER — APPOINTMENT (OUTPATIENT)
Dept: OTOLARYNGOLOGY | Facility: CLINIC | Age: 69
End: 2024-09-27

## 2024-10-04 ENCOUNTER — APPOINTMENT (OUTPATIENT)
Dept: OTOLARYNGOLOGY | Facility: CLINIC | Age: 69
End: 2024-10-04

## 2024-10-10 ENCOUNTER — APPOINTMENT (OUTPATIENT)
Dept: OTOLARYNGOLOGY | Facility: CLINIC | Age: 69
End: 2024-10-10

## 2025-01-29 NOTE — PROCEDURE
[Globus] : globus [None] : none [Flexible Endoscope] : examined with the flexible endoscope [Serial Number: ___] : Serial Number: [unfilled] [de-identified] : No lesions in the NPx, OPx, HPx or larynx. VC are mobile, airway patent.  Patient with severe LPRD changes on exam.\par  (1) Other Medications/None

## 2025-03-21 NOTE — ASU PREOP CHECKLIST - NS PREOP CHK MONITOR ANESTHESIA CONSENT
Ambulatory Care Coordination Note     3/21/2025 12:05 PM     Patient outreach attempt by this ACM today to perform care management follow up . ACM was unable to reach the patient by telephone today;   left voice message requesting a return phone call to this ACM.     ACM: Maxine Gallo RN     Care Summary Note: Unable to reach patient at this time.        Follow Up:   Plan for next ACM outreach in approximately 1 week to complete:  - goal progression  - follow-up appointment with Vascular Surgery .      Maxine Gallo RN BSN  [phone]       awaiting

## (undated) DEVICE — FOLEY HOLDER STATLOCK 2 WAY ADULT

## (undated) DEVICE — SYR LUER LOK 50CC

## (undated) DEVICE — SYR LUER LOK 20CC

## (undated) DEVICE — TUBING SUCTION CONN 6FT STERILE

## (undated) DEVICE — SUCTION YANKAUER NO CONTROL VENT

## (undated) DEVICE — SYR LUER LOK 5CC